# Patient Record
(demographics unavailable — no encounter records)

---

## 2025-01-07 NOTE — HISTORY OF PRESENT ILLNESS
[T: ___] : T[unfilled] [N: ___] : N[unfilled] [M: ___] : M[unfilled] [AJCC Stage: ____] : AJCC Stage: [unfilled] [100: Normal, no complaints, no evidence of disease.] : 100: Normal, no complaints, no evidence of disease. [de-identified] : Ms. IKE RUIZ is a 65 year old F who presents for evaluation and management of Left breast cancer.  Patient initially diagnosed with left breast cancer Stage IIB ER-OR-/Her2 3+ in 2018, [place] [facility] [place] bilateraal mastectomy in 6/2018 with no residual disease and continued on Herceptin/Perjeta until 2/2019. 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 Found to also have BRCA2 and underwent elective bilateral salpingo-oophorectomy.  [place] [facility] [place] with JOHNNA. [place] [facility] [place] [facility] due to change in insurance; now transferring to  [facility] ([facility]   All of the patient's prior records including radiology, pathology and prior notes reviewed; Past Medical History, Past Surgical History, Family History and Social history reviewed and updated in the patient's chart.  [de-identified] : ER-, IA-, Her2 3+ [de-identified] : 1/7/2025 Patient returns today to rule out progression or recurrence of breast cancer.  Remains on Celexa for anxiety/depression with good results - being filled by her PmD She  denies any SOB, CP, abdominal pain, bone pain, headache, or unexplained weight loss Today, she denies any breast masses, breast tenderness, skin changes or nipple discharge.  Last BMD - 12/2022 - osteoporosis; declines therapy GI - last colonoscopy -  in Feb 2024 per patient negative MRI Breast 9/11/2023 - no evidence of implant rupture

## 2025-01-07 NOTE — PHYSICAL EXAM
[Fully active, able to carry on all pre-disease performance without restriction] : Status 0 - Fully active, able to carry on all pre-disease performance without restriction [Normal] : affect appropriate [de-identified] : left posterior neck 2cm palpable lesion most c/w lipoma [de-identified] : bilateral mastectomy with implant reconstruction; left breast with radiation changes, noted architectural distortion of the left breast

## 2025-01-07 NOTE — ASSESSMENT
[FreeTextEntry1] : 64 yo woman with history of left breast cancer Stage IIB ER/LA-, Her2 3+ s/p neoadjuvant ddAC-THP and bilat mastectomy in 6/2018, completed HP in 2/2019; [place] [facility] [place]  - BRCA2+; s/p salpingoophorectomy; following with [facility] (Dr. Bjorn Marks) - imaging of bilateral breast with MRI Breast 9/2023 for implant integrity was negative; for reimaging as per Plastic Surgery recommendations - bone density again in 12/2022 with osteoporosis ; has been offered prolia which she declined; has been exercising - labs from 1/2024 reviewed including chem panel and vitamin D - Patient had the opportunity to have all their questions answered to their satisfaction - patient had bw done with PMD and she will have it sent to us for review.  - f/u in 6 months in office;

## 2025-07-08 NOTE — HISTORY OF PRESENT ILLNESS
[T: ___] : T[unfilled] [N: ___] : N[unfilled] [M: ___] : M[unfilled] [AJCC Stage: ____] : AJCC Stage: [unfilled] [100: Normal, no complaints, no evidence of disease.] : 100: Normal, no complaints, no evidence of disease. [de-identified] : Ms. IKE RUIZ is a 65 year old F who presents for evaluation and management of Left breast cancer.  Patient initially diagnosed with left breast cancer Stage IIB ER-MN-/Her2 3+ in 2018, underwent neoadjuvant chemotherapy with ddAC-THP at [facility] [place], thereafter bilateraal mastectomy in 6/2018 with no residual disease and continued on Herceptin/Perjeta until 2/2019. 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 Found to also have BRCA2 and underwent elective bilateral salpingo-oophorectomy.  Last Breast MRI at [facility] [place] 7/2021 with JOHNNA. Transferred care initially from [facility] [place] to [facility] due to change in insurance; now transferring to  [facility] ([facility])   All of the patient's prior records including radiology, pathology and prior notes reviewed; Past Medical History, Past Surgical History, Family History and Social history reviewed and updated in the patient's chart.  [de-identified] : ER-, NE-, Her2 3+ [de-identified] : 7/8/25 Patient returns today to rule out progression or recurrence of breast cancer.  Was previously seen by Psych and was started on Celexa (Citalopram) 20 mg daily with good relief; wishes to continue with this and requested refill - Reports severe vaginal dryness to the point of it being very painful to have GYN exams; requesting new Female GYN  She  denies any SOB, CP, abdominal pain, bone pain, headache, or unexplained weight loss Today, she denies any chest wall/breast reconstruction site changes  Last BMD - 12/2022 - osteoporosis; declines therapy GI - last colonoscopy -  in Feb 2024 per patient negative MRI Breast 9/11/2023 - no evidence of implant rupture

## 2025-07-08 NOTE — REVIEW OF SYSTEMS
[Anxiety] : anxiety [Depression] : depression [Negative] : Allergic/Immunologic [de-identified] : well controlled on celexa

## 2025-07-08 NOTE — PHYSICAL EXAM
[Fully active, able to carry on all pre-disease performance without restriction] : Status 0 - Fully active, able to carry on all pre-disease performance without restriction [Normal] : grossly intact [de-identified] : left posterior neck 2cm palpable lesion most c/w lipoma [de-identified] : bilateral mastectomy with implant reconstruction; left breast with radiation changes, noted architectural distortion of the left breast overall stable [de-identified] : depressed mood seems to have improved with Celexa

## 2025-07-08 NOTE — ADDENDUM
[FreeTextEntry1] :  I, Grayson Boyd, affirm that I have recorded for Dr. Benton on 07/08/2025 to the best of my ability. All medical record entries scribed were at my, Dr. Benton's, direction on 07/08/2025 . I have reviewed the chart and agree that the record accurately reflects my personal performance of the history, physical exam, assessment and plan. I have also personally directed, reviewed, and agree with the discharge instructions.

## 2025-07-08 NOTE — REVIEW OF SYSTEMS
[Anxiety] : anxiety [Depression] : depression [Negative] : Allergic/Immunologic [de-identified] : well controlled on celexa

## 2025-07-08 NOTE — HISTORY OF PRESENT ILLNESS
[T: ___] : T[unfilled] [N: ___] : N[unfilled] [M: ___] : M[unfilled] [AJCC Stage: ____] : AJCC Stage: [unfilled] [100: Normal, no complaints, no evidence of disease.] : 100: Normal, no complaints, no evidence of disease. [de-identified] : Ms. IKE RUIZ is a 65 year old F who presents for evaluation and management of Left breast cancer.  Patient initially diagnosed with left breast cancer Stage IIB ER-CT-/Her2 3+ in 2018, underwent neoadjuvant chemotherapy with ddAC-THP at [facility] [place], thereafter bilateraal mastectomy in 6/2018 with no residual disease and continued on Herceptin/Perjeta until 2/2019. 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 Found to also have BRCA2 and underwent elective bilateral salpingo-oophorectomy.  Last Breast MRI at [facility] [place] 7/2021 with JOHNNA. Transferred care initially from [facility] to [facility] due to change in insurance; now transferring to  [facility] (formerly [facility])   All of the patient's prior records including radiology, pathology and prior notes reviewed; Past Medical History, Past Surgical History, Family History and Social history reviewed and updated in the patient's chart.  [de-identified] : ER-, [place]-, Her2 3+ [de-identified] : 7/8/25 Patient returns today to rule out progression or recurrence of breast cancer.  Was previously seen by Psych and was started on Celexa (Citalopram) 20 mg daily with good relief; wishes to continue with this and requested refill - Reports severe vaginal dryness to the point of it being very painful to have GYN exams; requesting new Female GYN  She  denies any SOB, CP, abdominal pain, bone pain, headache, or unexplained weight loss Today, she denies any chest wall/breast reconstruction site changes  Last BMD - 12/2022 - osteoporosis; declines therapy GI - last colonoscopy -  in Feb 2024 per patient negative MRI Breast 9/11/2023 - no evidence of implant rupture

## 2025-07-08 NOTE — ASSESSMENT
[FreeTextEntry1] : 64 yo woman with history of left breast cancer Stage IIB ER/[place]-, Her2 3+ s/p neoadjuvant ddAC-THP and bilat mastectomy in 6/2018, completed HP in 2/2019; [place] [facility] [place]  - [place]2+; s/p salpingoophorectomy; previously following with GYN  (Dr. Bjorn Marks) but wishes for female GYN - Referred Dr. Mike Pool; advised to use OTC vaginal lubricants (Replens, Revaree, Hyalogen);  - imaging of bilateral breast with MRI Breast 9/2023 for implant integrity was negative; for reimaging as per Plastic Surgery recommendations - bone density again in 12/2022 with osteoporosis ; has been offered prolia which she declined; has been exercising/strength training and referred for repeat Bone density  - labs from 1/2024 reviewed including chem panel and vitamin D; will repeat today - for depression will continue Celexa 20mg daily; advised if need to change therapy, she will need to return to Psychiatrist or find a new Psychiatrist.  - Patient had the opportunity to have all their questions answered to their satisfaction - f/u in 6 months in office

## 2025-07-08 NOTE — PHYSICAL EXAM
[Fully active, able to carry on all pre-disease performance without restriction] : Status 0 - Fully active, able to carry on all pre-disease performance without restriction [Normal] : grossly intact [de-identified] : left posterior neck 2cm palpable lesion most c/w lipoma [de-identified] : bilateral mastectomy with implant reconstruction; left breast with radiation changes, noted architectural distortion of the left breast overall stable [de-identified] : depressed mood seems to have improved with Celexa

## 2025-07-08 NOTE — ASSESSMENT
[FreeTextEntry1] : 64 yo woman with history of left breast cancer Stage IIB ER/[place]-, Her2 3+ s/p neoadjuvant ddAC-THP and bilat mastectomy in 6/2018, completed HP in 2/2019; all treatment at [facility] [place]  - BRCA2+; s/p salpingoophorectomy; previously following with GYN  (Dr. Bjorn Marks) but wishes for female GYN - Referred Dr. Mike Pool; advised to use OTC vaginal lubricants (Replens, Revaree, Hyalogen);  - imaging of bilateral breast with MRI Breast 9/2023 for implant integrity was negative; for reimaging as per Plastic Surgery recommendations - bone density again in 12/2022 with osteoporosis ; has been offered prolia which she declined; has been exercising/strength training and referred for repeat Bone density  - labs from 1/2024 reviewed including chem panel and vitamin D; will repeat today - for depression will continue Celexa 20mg daily; advised if need to change therapy, she will need to return to Psychiatrist or find a new Psychiatrist.  - Patient had the opportunity to have all their questions answered to their satisfaction - f/u in 6 months in office